# Patient Record
Sex: MALE | URBAN - METROPOLITAN AREA
[De-identification: names, ages, dates, MRNs, and addresses within clinical notes are randomized per-mention and may not be internally consistent; named-entity substitution may affect disease eponyms.]

---

## 2022-11-01 ENCOUNTER — TELEPHONE ENCOUNTER (OUTPATIENT)
Dept: URBAN - METROPOLITAN AREA CLINIC 82 | Facility: CLINIC | Age: 22
End: 2022-11-01

## 2022-12-30 ENCOUNTER — OFFICE VISIT (OUTPATIENT)
Dept: URBAN - METROPOLITAN AREA CLINIC 82 | Facility: CLINIC | Age: 22
End: 2022-12-30
Payer: COMMERCIAL

## 2022-12-30 ENCOUNTER — WEB ENCOUNTER (OUTPATIENT)
Dept: URBAN - METROPOLITAN AREA CLINIC 82 | Facility: CLINIC | Age: 22
End: 2022-12-30

## 2022-12-30 ENCOUNTER — LAB OUTSIDE AN ENCOUNTER (OUTPATIENT)
Dept: URBAN - METROPOLITAN AREA CLINIC 82 | Facility: CLINIC | Age: 22
End: 2022-12-30

## 2022-12-30 VITALS
TEMPERATURE: 96.9 F | DIASTOLIC BLOOD PRESSURE: 62 MMHG | HEIGHT: 65 IN | WEIGHT: 147.8 LBS | BODY MASS INDEX: 24.62 KG/M2 | SYSTOLIC BLOOD PRESSURE: 105 MMHG | HEART RATE: 60 BPM

## 2022-12-30 DIAGNOSIS — R10.9 ABDOMINAL PAIN, UNSPECIFIED ABDOMINAL LOCATION: ICD-10-CM

## 2022-12-30 DIAGNOSIS — K59.00 CONSTIPATION, UNSPECIFIED CONSTIPATION TYPE: ICD-10-CM

## 2022-12-30 DIAGNOSIS — R19.7 DIARRHEA, UNSPECIFIED TYPE: ICD-10-CM

## 2022-12-30 DIAGNOSIS — R19.4 BOWEL HABIT CHANGES: ICD-10-CM

## 2022-12-30 PROCEDURE — 99204 OFFICE O/P NEW MOD 45 MIN: CPT | Performed by: STUDENT IN AN ORGANIZED HEALTH CARE EDUCATION/TRAINING PROGRAM

## 2022-12-30 NOTE — HPI-TODAY'S VISIT:
22 y.o male presents today for concerns of changes of bowel habits on and off since 3 years. He was first experiencing constipation, BM: once every two days, then switch to having diarrhea, type 6-7 on BFSC after taking miralax and increasing his fiber intake. For the past two months now, pt reports increased in BM up to 6 per day without using miralax or stool softner. Does reports o incomplete evacuation sensation but denies any fecal incontinence. No nocturnal sx or wt loss.  He admits generalized abd pain, resolved after BMs.  Also reports of BRBPR on toilet paper and 1-2 episodes of mixing in with stool.   Denies any acid reflux, bloating, indigestion. Reports "slight" anemia at PCP in Nov 2022, labs will be repeated in Jan 2023. No FHX of anemia, IBD, celiac dz, or CRC

## 2023-01-03 PROBLEM — 14760008: Status: ACTIVE | Noted: 2022-12-30

## 2023-01-03 PROBLEM — 88111009: Status: ACTIVE | Noted: 2022-12-30

## 2023-01-03 PROBLEM — 62315008: Status: ACTIVE | Noted: 2022-12-30

## 2023-01-03 PROBLEM — 21522001: Status: ACTIVE | Noted: 2022-12-30

## 2023-01-04 ENCOUNTER — CLAIMS CREATED FROM THE CLAIM WINDOW (OUTPATIENT)
Dept: URBAN - METROPOLITAN AREA CLINIC 4 | Facility: CLINIC | Age: 23
End: 2023-01-04
Payer: COMMERCIAL

## 2023-01-04 ENCOUNTER — OFFICE VISIT (OUTPATIENT)
Dept: URBAN - METROPOLITAN AREA SURGERY CENTER 13 | Facility: SURGERY CENTER | Age: 23
End: 2023-01-04
Payer: COMMERCIAL

## 2023-01-04 DIAGNOSIS — K50.111 CROHN'S COLITIS, WITH RECTAL BLEEDING: ICD-10-CM

## 2023-01-04 DIAGNOSIS — R10.33 ABDOMINAL PAIN, ACUTE, PERIUMBILICAL: ICD-10-CM

## 2023-01-04 DIAGNOSIS — K50.118 CROHN'S DISEASE OF LARGE INTESTINE WITH OTHER COMPLICATION: ICD-10-CM

## 2023-01-04 PROCEDURE — 88305 TISSUE EXAM BY PATHOLOGIST: CPT | Performed by: PATHOLOGY

## 2023-01-04 PROCEDURE — G8907 PT DOC NO EVENTS ON DISCHARG: HCPCS | Performed by: INTERNAL MEDICINE

## 2023-01-04 PROCEDURE — 45380 COLONOSCOPY AND BIOPSY: CPT | Performed by: INTERNAL MEDICINE

## 2023-01-04 RX ORDER — BUDESONIDE 3 MG/1
3 CAPSULES CAPSULE ORAL ONCE A DAY
Qty: 90 | Refills: 1 | OUTPATIENT

## 2023-01-06 LAB
A/G RATIO: 0.9
ALBUMIN: 4.1
ALKALINE PHOSPHATASE: 87
ALT (SGPT): 10
AST (SGOT): 14
BILIRUBIN, TOTAL: 0.9
BUN/CREATININE RATIO: (no result)
BUN: 17
C-REACTIVE PROTEIN, QUANT: 31.6
CALCIUM: 9.2
CARBON DIOXIDE, TOTAL: 24
CHLORIDE: 100
CREATININE: 1.04
EGFR: 105
FERRITIN, SERUM: 56
GLOBULIN, TOTAL: 4.4
GLUCOSE: 69
HEMATOCRIT: 40.2
HEMOGLOBIN: 12.9
HEPATITIS B CORE AB TOTAL: (no result)
HEPATITIS B SURFACE AB IMMUNITY, QN: <5
HEPATITIS B SURFACE ANTIGEN: (no result)
IRON BIND.CAP.(TIBC): 260
IRON SATURATION: 23
IRON: 61
MCH: 26.5
MCHC: 32.1
MCV: 82.5
MITOGEN-NIL: >10
MPV: 11.9
PLATELET COUNT: 215
POTASSIUM: 4.3
PROTEIN, TOTAL: 8.5
QUANTIFERON NIL VALUE: 0.06
QUANTIFERON TB1 AG VALUE: 0.01
QUANTIFERON TB2 AG VALUE: 0.08
QUANTIFERON-TB GOLD PLUS: NEGATIVE
RDW: 12.4
RED BLOOD CELL COUNT: 4.87
SODIUM: 134
WHITE BLOOD CELL COUNT: 5.1

## 2023-01-11 ENCOUNTER — LAB OUTSIDE AN ENCOUNTER (OUTPATIENT)
Dept: URBAN - METROPOLITAN AREA CLINIC 115 | Facility: CLINIC | Age: 23
End: 2023-01-11

## 2023-01-17 ENCOUNTER — TELEPHONE ENCOUNTER (OUTPATIENT)
Dept: URBAN - METROPOLITAN AREA CLINIC 92 | Facility: CLINIC | Age: 23
End: 2023-01-17

## 2023-01-17 ENCOUNTER — TELEPHONE ENCOUNTER (OUTPATIENT)
Dept: URBAN - METROPOLITAN AREA CLINIC 115 | Facility: CLINIC | Age: 23
End: 2023-01-17

## 2023-01-17 LAB
C. DIFFICILE TOXIN A/B, STOOL - QDX: NEGATIVE
CALPROTECTIN, STOOL - QDX: (no result)
GASTROINTESTINAL PATHOGEN: (no result)

## 2023-01-17 RX ORDER — BUDESONIDE 3 MG/1
3 CAPSULES CAPSULE ORAL ONCE A DAY
Qty: 90 | Refills: 1 | Status: ACTIVE | COMMUNITY

## 2023-01-17 RX ORDER — FIDAXOMICIN 200 MG/1
1 TABLET TABLET, FILM COATED ORAL TWICE A DAY
Qty: 20 | OUTPATIENT
Start: 2023-01-17 | End: 2023-01-27

## 2023-01-27 ENCOUNTER — ERX REFILL RESPONSE (OUTPATIENT)
Dept: URBAN - METROPOLITAN AREA CLINIC 82 | Facility: CLINIC | Age: 23
End: 2023-01-27

## 2023-01-27 RX ORDER — BUDESONIDE 3 MG/1
3 CAPSULES CAPSULE ORAL ONCE A DAY
Qty: 90 | Refills: 1 | OUTPATIENT

## 2023-01-27 RX ORDER — BUDESONIDE 3 MG/1
3 CAPSULES ORALLY ONCE A DAY 30 DAYS CAPSULE, GELATIN COATED ORAL
Qty: 90 CAPSULE | Refills: 1 | OUTPATIENT

## 2023-02-03 ENCOUNTER — OFFICE VISIT (OUTPATIENT)
Dept: URBAN - METROPOLITAN AREA CLINIC 82 | Facility: CLINIC | Age: 23
End: 2023-02-03

## 2023-02-03 RX ORDER — BUDESONIDE 3 MG/1
3 CAPSULES ORALLY ONCE A DAY 30 DAYS CAPSULE, GELATIN COATED ORAL
Qty: 90 CAPSULE | Refills: 1 | Status: ACTIVE | COMMUNITY

## 2023-02-23 ENCOUNTER — ERX REFILL RESPONSE (OUTPATIENT)
Dept: URBAN - METROPOLITAN AREA CLINIC 82 | Facility: CLINIC | Age: 23
End: 2023-02-23

## 2023-02-23 RX ORDER — BUDESONIDE 3 MG/1
TAKE 3 CAPSULES BY MOUTH EVERY DAY CAPSULE, GELATIN COATED ORAL
Qty: 90 CAPSULE | Refills: 1 | OUTPATIENT

## 2023-02-23 RX ORDER — BUDESONIDE 3 MG/1
3 CAPSULES ORALLY ONCE A DAY 30 DAYS CAPSULE, GELATIN COATED ORAL
Qty: 90 CAPSULE | Refills: 1 | OUTPATIENT

## 2023-02-28 ENCOUNTER — OFFICE VISIT (OUTPATIENT)
Dept: URBAN - METROPOLITAN AREA CLINIC 82 | Facility: CLINIC | Age: 23
End: 2023-02-28

## 2023-02-28 RX ORDER — BUDESONIDE 3 MG/1
TAKE 3 CAPSULES BY MOUTH EVERY DAY CAPSULE, GELATIN COATED ORAL
Qty: 90 CAPSULE | Refills: 1 | Status: ACTIVE | COMMUNITY

## 2023-03-17 ENCOUNTER — OFFICE VISIT (OUTPATIENT)
Dept: URBAN - METROPOLITAN AREA CLINIC 82 | Facility: CLINIC | Age: 23
End: 2023-03-17
Payer: COMMERCIAL

## 2023-03-17 VITALS
HEART RATE: 65 BPM | RESPIRATION RATE: 15 BRPM | DIASTOLIC BLOOD PRESSURE: 64 MMHG | BODY MASS INDEX: 24.16 KG/M2 | TEMPERATURE: 97.5 F | SYSTOLIC BLOOD PRESSURE: 99 MMHG | HEIGHT: 65 IN | WEIGHT: 145 LBS

## 2023-03-17 DIAGNOSIS — A04.72 C. DIFFICILE COLITIS: ICD-10-CM

## 2023-03-17 DIAGNOSIS — K52.9 IBD (INFLAMMATORY BOWEL DISEASE): ICD-10-CM

## 2023-03-17 DIAGNOSIS — K50.10 CROHN'S DISEASE OF LARGE INTESTINE WITHOUT COMPLICATION: ICD-10-CM

## 2023-03-17 PROBLEM — 307418008 NON-INFECTIVE ENTERITIS AND COLITIS: Status: ACTIVE | Noted: 2023-03-17

## 2023-03-17 PROBLEM — 7620006: Status: ACTIVE | Noted: 2023-03-17

## 2023-03-17 PROBLEM — 423590009: Status: ACTIVE | Noted: 2023-03-17

## 2023-03-17 PROCEDURE — 99214 OFFICE O/P EST MOD 30 MIN: CPT | Performed by: INTERNAL MEDICINE

## 2023-03-17 PROCEDURE — 86480 TB TEST CELL IMMUN MEASURE: CPT | Performed by: INTERNAL MEDICINE

## 2023-03-17 RX ORDER — BUDESONIDE 3 MG/1
TAKE 3 CAPSULES BY MOUTH EVERY DAY CAPSULE, GELATIN COATED ORAL
Qty: 90 CAPSULE | Refills: 1 | Status: ACTIVE | COMMUNITY

## 2023-03-17 NOTE — HPI-TODAY'S VISIT:
22 y.o male presents today for 4 months F/U. Recap of last visit: changes of bowel habits on and off since 3 years. He was first experiencing constipation, BM: once every two days, then switch to having diarrhea, type 6-7 on BFSC after taking miralax and increasing his fiber intake. He admits generalized abd pain, resolved after BMs.  Also reports of BRBPR on toilet paper and 1-2 episodes of mixing in with stool.  Colonoscopy 1/2023: Moderate inflammation was found in the rectum and in the entire examined colon secondary to pancolitis ulcerative colitis. The examined portion of the ileum was normal. Internal hemorrhoids noted. Bx result: Significant active inflammation throughout rectum and colon consistent with Crohns disease and Crohns colitis. Patient was given budesonide 9 mg PO one time per day daily. Plan to repeat colonoscopy in 1 year to evaluate the response to therapy. Stool studies also revealed c diff, was given difficid to use.  At this visit, patient admits improvements in bowel habits. BM: 2 per day, improvements in BRBPR, only having it a few times a month. No abd pain. No fecal urgency. Reports compliance w/ difficid. Still taking budesonide TID, sometimes would miss a dose tho. No other concerns.

## 2023-03-24 LAB
A/G RATIO: 1
ABSOLUTE BASOPHILS: 12
ABSOLUTE EOSINOPHILS: 98
ABSOLUTE LYMPHOCYTES: 1853
ABSOLUTE MONOCYTES: 437
ABSOLUTE NEUTROPHILS: 1502
ALBUMIN: 4.1
ALKALINE PHOSPHATASE: 84
ALT (SGPT): 12
ANCA SCREEN: NEGATIVE
AST (SGOT): 12
BASOPHILS: 0.3
BILIRUBIN, TOTAL: 0.5
BUN/CREATININE RATIO: (no result)
BUN: 14
CALCIUM: 9.4
CARBON DIOXIDE, TOTAL: 28
CHLORIDE: 104
CREATININE: 0.98
EGFR: 113
EOSINOPHILS: 2.5
GLOBULIN, TOTAL: 4
GLUCOSE: 83
HBSAG SCREEN: (no result)
HEMATOCRIT: 38.9
HEMOGLOBIN: 12.5
HEP A AB, IGM: (no result)
HEP B CORE AB, IGM: (no result)
HEP C VIRUS AB: <0.02
HEPATITIS C ANTIBODY: (no result)
LYMPHOCYTES: 47.5
MCH: 26.7
MCHC: 32.1
MCV: 83.1
MITOGEN-NIL: >10
MONOCYTES: 11.2
MPV: 11.9
MYELOPEROXIDASE ANTIBODY: <1
NEUTROPHILS: 38.5
PLATELET COUNT: 213
POTASSIUM: 4.2
PROTEIN, TOTAL: 8.1
PROTEINASE-3 ANTIBODY: <1
QUANTIFERON NIL VALUE: 0.07
QUANTIFERON TB1 AG VALUE: <0
QUANTIFERON TB2 AG VALUE: 0.01
QUANTIFERON-TB GOLD PLUS: NEGATIVE
RDW: 13.1
RED BLOOD CELL COUNT: 4.68
SACCHAROMYCES CEREVISIAE AB (ASCA) (IGA): 6
SACCHAROMYCES CEREVISIAE AB (ASCA) (IGG): 7.9
SODIUM: 138
WHITE BLOOD CELL COUNT: 3.9

## 2023-03-30 ENCOUNTER — ERX REFILL RESPONSE (OUTPATIENT)
Dept: URBAN - METROPOLITAN AREA CLINIC 82 | Facility: CLINIC | Age: 23
End: 2023-03-30

## 2023-03-30 RX ORDER — BUDESONIDE 3 MG/1
TAKE 3 CAPSULES BY MOUTH EVERY DAY CAPSULE, GELATIN COATED ORAL
Qty: 90 CAPSULE | Refills: 1 | OUTPATIENT

## 2023-04-14 ENCOUNTER — ERX REFILL RESPONSE (OUTPATIENT)
Dept: URBAN - METROPOLITAN AREA CLINIC 82 | Facility: CLINIC | Age: 23
End: 2023-04-14

## 2023-04-14 RX ORDER — BUDESONIDE 3 MG/1
TAKE 3 CAPSULES BY MOUTH EVERY DAY 30 CAPSULE, GELATIN COATED ORAL
Qty: 270 CAPSULE | Refills: 1 | OUTPATIENT

## 2023-04-14 RX ORDER — BUDESONIDE 3 MG/1
TAKE 3 CAPSULES BY MOUTH EVERY DAY CAPSULE, GELATIN COATED ORAL
Qty: 90 CAPSULE | Refills: 1 | OUTPATIENT

## 2023-04-18 ENCOUNTER — OFFICE VISIT (OUTPATIENT)
Dept: URBAN - METROPOLITAN AREA CLINIC 82 | Facility: CLINIC | Age: 23
End: 2023-04-18

## 2023-04-18 RX ORDER — BUDESONIDE 3 MG/1
TAKE 3 CAPSULES BY MOUTH EVERY DAY CAPSULE, GELATIN COATED ORAL
Qty: 90 CAPSULE | Refills: 1 | COMMUNITY

## 2023-06-02 ENCOUNTER — OFFICE VISIT (OUTPATIENT)
Dept: URBAN - METROPOLITAN AREA CLINIC 82 | Facility: CLINIC | Age: 23
End: 2023-06-02

## 2023-06-15 ENCOUNTER — OFFICE VISIT (OUTPATIENT)
Dept: URBAN - METROPOLITAN AREA CLINIC 82 | Facility: CLINIC | Age: 23
End: 2023-06-15
Payer: COMMERCIAL

## 2023-06-15 VITALS
HEIGHT: 65 IN | DIASTOLIC BLOOD PRESSURE: 67 MMHG | TEMPERATURE: 97.9 F | WEIGHT: 145.6 LBS | SYSTOLIC BLOOD PRESSURE: 103 MMHG | HEART RATE: 73 BPM | BODY MASS INDEX: 24.26 KG/M2

## 2023-06-15 DIAGNOSIS — K50.10 CROHN'S DISEASE OF COLON WITHOUT COMPLICATION: ICD-10-CM

## 2023-06-15 PROBLEM — 50440006: Status: ACTIVE | Noted: 2023-06-15

## 2023-06-15 PROCEDURE — 99213 OFFICE O/P EST LOW 20 MIN: CPT | Performed by: INTERNAL MEDICINE

## 2023-06-15 RX ORDER — BUDESONIDE 3 MG/1
TAKE 3 CAPSULES BY MOUTH EVERY DAY 30 CAPSULE, GELATIN COATED ORAL
Qty: 270 CAPSULE | Refills: 1 | Status: ACTIVE | COMMUNITY

## 2023-06-15 RX ORDER — USTEKINUMAB 90 MG/ML
90 MG INJECTION, SOLUTION SUBCUTANEOUS
Qty: 1 | Refills: 6 | OUTPATIENT
Start: 2023-06-15

## 2023-06-15 RX ORDER — BUDESONIDE 3 MG/1
TAKE 3 CAPSULES BY MOUTH EVERY DAY CAPSULE, GELATIN COATED ORAL
Qty: 270 | Refills: 0

## 2023-06-15 RX ORDER — BUDESONIDE 3 MG/1
TAKE 3 CAPSULES BY MOUTH EVERY DAY CAPSULE, GELATIN COATED ORAL
Qty: 90 CAPSULE | Refills: 1 | COMMUNITY

## 2023-06-15 RX ORDER — USTEKINUMAB 130 MG/26ML
AS DIRECTED SOLUTION INTRAVENOUS ONCE
Qty: 390 MILLIGRAMS | Refills: 0 | OUTPATIENT
Start: 2023-06-15 | End: 2023-06-16

## 2023-06-15 NOTE — HPI-TODAY'S VISIT:
Colonoscopy '23 w moderate pan-Crohns colitis, nml TI. Has remained on budesonide, sx controlled. autism. mother present for decisions.

## 2023-06-22 ENCOUNTER — TELEPHONE ENCOUNTER (OUTPATIENT)
Dept: URBAN - METROPOLITAN AREA CLINIC 97 | Facility: CLINIC | Age: 23
End: 2023-06-22

## 2023-06-27 ENCOUNTER — OFFICE VISIT (OUTPATIENT)
Dept: URBAN - METROPOLITAN AREA CLINIC 114 | Facility: CLINIC | Age: 23
End: 2023-06-27
Payer: COMMERCIAL

## 2023-06-27 VITALS
WEIGHT: 140.6 LBS | RESPIRATION RATE: 18 BRPM | BODY MASS INDEX: 23.43 KG/M2 | HEART RATE: 77 BPM | SYSTOLIC BLOOD PRESSURE: 109 MMHG | HEIGHT: 65 IN | TEMPERATURE: 97.5 F | DIASTOLIC BLOOD PRESSURE: 70 MMHG

## 2023-06-27 DIAGNOSIS — K50.80 CROHN'S COLITIS: ICD-10-CM

## 2023-06-27 PROCEDURE — 96413 CHEMO IV INFUSION 1 HR: CPT | Performed by: INTERNAL MEDICINE

## 2023-06-27 RX ORDER — BUDESONIDE 3 MG/1
TAKE 3 CAPSULES BY MOUTH EVERY DAY CAPSULE, GELATIN COATED ORAL
Qty: 90 CAPSULE | Refills: 1 | COMMUNITY

## 2023-06-27 RX ORDER — USTEKINUMAB 90 MG/ML
90 MG INJECTION, SOLUTION SUBCUTANEOUS
Qty: 1 | Refills: 6 | Status: ACTIVE | COMMUNITY
Start: 2023-06-15

## 2023-06-27 RX ORDER — BUDESONIDE 3 MG/1
TAKE 3 CAPSULES BY MOUTH EVERY DAY CAPSULE, GELATIN COATED ORAL
Qty: 270 | Refills: 0 | Status: ACTIVE | COMMUNITY

## 2023-06-30 ENCOUNTER — TELEPHONE ENCOUNTER (OUTPATIENT)
Dept: URBAN - METROPOLITAN AREA CLINIC 117 | Facility: CLINIC | Age: 23
End: 2023-06-30

## 2023-08-22 ENCOUNTER — TELEPHONE ENCOUNTER (OUTPATIENT)
Dept: URBAN - METROPOLITAN AREA CLINIC 97 | Facility: CLINIC | Age: 23
End: 2023-08-22

## 2023-08-24 ENCOUNTER — WEB ENCOUNTER (OUTPATIENT)
Dept: URBAN - METROPOLITAN AREA CLINIC 82 | Facility: CLINIC | Age: 23
End: 2023-08-24

## 2023-09-22 ENCOUNTER — DASHBOARD ENCOUNTERS (OUTPATIENT)
Age: 23
End: 2023-09-22

## 2023-09-22 ENCOUNTER — OFFICE VISIT (OUTPATIENT)
Dept: URBAN - METROPOLITAN AREA CLINIC 115 | Facility: CLINIC | Age: 23
End: 2023-09-22
Payer: COMMERCIAL

## 2023-09-22 VITALS
SYSTOLIC BLOOD PRESSURE: 109 MMHG | HEIGHT: 65 IN | TEMPERATURE: 98.5 F | WEIGHT: 142.6 LBS | DIASTOLIC BLOOD PRESSURE: 72 MMHG | BODY MASS INDEX: 23.76 KG/M2 | HEART RATE: 69 BPM

## 2023-09-22 DIAGNOSIS — K51.00 ULCERATIVE PANCOLITIS: ICD-10-CM

## 2023-09-22 DIAGNOSIS — D64.89 ANEMIA DUE TO OTHER CAUSE: ICD-10-CM

## 2023-09-22 PROBLEM — 444548001: Status: ACTIVE | Noted: 2023-09-22

## 2023-09-22 PROCEDURE — 99213 OFFICE O/P EST LOW 20 MIN: CPT | Performed by: INTERNAL MEDICINE

## 2023-09-22 RX ORDER — BUDESONIDE 3 MG/1
2 CAP DAILY FOR 2 WEEKS THEN 1 CAP DAILY FOR 2 WEEKS THEN STOP CAPSULE, GELATIN COATED ORAL
Qty: 42 | Refills: 0

## 2023-09-22 RX ORDER — BUDESONIDE 3 MG/1
TAKE 3 CAPSULES BY MOUTH EVERY DAY CAPSULE, GELATIN COATED ORAL
Qty: 90 CAPSULE | Refills: 1 | Status: DISCONTINUED | COMMUNITY

## 2023-09-22 RX ORDER — USTEKINUMAB 90 MG/ML
90 MG INJECTION, SOLUTION SUBCUTANEOUS
Qty: 1 | Refills: 6 | Status: ACTIVE | COMMUNITY
Start: 2023-06-15

## 2023-09-22 RX ORDER — BUDESONIDE 3 MG/1
TAKE 3 CAPSULES BY MOUTH EVERY DAY CAPSULE, GELATIN COATED ORAL
Qty: 270 | Refills: 0 | Status: ACTIVE | COMMUNITY

## 2023-09-22 NOTE — HPI-TODAY'S VISIT:
Selara infusion 6/2023, first injection 9/2023. On budesonide. Asx. Prior hx: Colonoscopy '23 w moderate pan-Crohns colitis, nml TI. Has remained on budesonide, sx controlled. autism. previously, mother present for decisions.

## 2023-10-05 ENCOUNTER — LAB OUTSIDE AN ENCOUNTER (OUTPATIENT)
Dept: URBAN - METROPOLITAN AREA CLINIC 115 | Facility: CLINIC | Age: 23
End: 2023-10-05

## 2023-10-12 LAB
A/G RATIO: 1.1
ALBUMIN: 4.2
ALKALINE PHOSPHATASE: 103
ALT (SGPT): 10
AST (SGOT): 11
BILIRUBIN, TOTAL: 0.6
BUN/CREATININE RATIO: (no result)
BUN: 16
C-REACTIVE PROTEIN, QUANT: 8.6
CALCIUM: 9.5
CALPROTECTIN, FECAL: 2230
CARBON DIOXIDE, TOTAL: 25
CHLORIDE: 103
CLOSTRIDIUM DIFFICILE: (no result)
CREATININE: 1.01
EGFR: 108
FERRITIN, SERUM: 26
FOLATE (FOLIC ACID), SERUM: 16
GLOBULIN, TOTAL: 4
GLUCOSE: 99
HEMATOCRIT: 42.8
HEMOGLOBIN: 14.1
HEPATITIS B CORE AB TOTAL: (no result)
IRON BIND.CAP.(TIBC): 285
IRON SATURATION: 20
IRON: 58
MCH: 27.1
MCHC: 32.9
MCV: 82.1
MPV: 12.1
PLATELET COUNT: 190
POTASSIUM: 4.2
PROTEIN, TOTAL: 8.2
RDW: 12.8
RED BLOOD CELL COUNT: 5.21
SODIUM: 138
VITAMIN B12: 551
WHITE BLOOD CELL COUNT: 3.4

## 2023-11-13 ENCOUNTER — ERX REFILL RESPONSE (OUTPATIENT)
Dept: URBAN - METROPOLITAN AREA CLINIC 115 | Facility: CLINIC | Age: 23
End: 2023-11-13

## 2023-11-13 RX ORDER — BUDESONIDE 3 MG/1
2 CAP DAILY FOR 2 WEEKS THEN 1 CAP DAILY FOR 2 WEEKS THEN STOP 28 DAYS CAPSULE, GELATIN COATED ORAL
Qty: 126 CAPSULE | Refills: 1 | OUTPATIENT

## 2023-11-13 RX ORDER — BUDESONIDE 3 MG/1
2 CAP DAILY FOR 2 WEEKS THEN 1 CAP DAILY FOR 2 WEEKS THEN STOP CAPSULE, GELATIN COATED ORAL
Qty: 42 | Refills: 0 | OUTPATIENT

## 2023-12-12 ENCOUNTER — OFFICE VISIT (OUTPATIENT)
Dept: URBAN - METROPOLITAN AREA CLINIC 115 | Facility: CLINIC | Age: 23
End: 2023-12-12

## 2024-01-23 ENCOUNTER — OFFICE VISIT (OUTPATIENT)
Dept: URBAN - METROPOLITAN AREA CLINIC 115 | Facility: CLINIC | Age: 24
End: 2024-01-23

## 2024-02-20 ENCOUNTER — OV EP (OUTPATIENT)
Dept: URBAN - METROPOLITAN AREA CLINIC 115 | Facility: CLINIC | Age: 24
End: 2024-02-20

## 2024-09-03 ENCOUNTER — ERX REFILL RESPONSE (OUTPATIENT)
Dept: URBAN - METROPOLITAN AREA CLINIC 82 | Facility: CLINIC | Age: 24
End: 2024-09-03

## 2024-09-03 RX ORDER — USTEKINUMAB 90 MG/ML
INJECT 1 SYRINGE UNDER THE SKIN EVERY 8 WEEKS INJECTION, SOLUTION SUBCUTANEOUS
Qty: 1 | Refills: 7 | OUTPATIENT

## 2024-09-03 RX ORDER — USTEKINUMAB 90 MG/ML
INJECT 1 SYRINGE UNDER THE SKIN EVERY 8 WEEKS INJECTION, SOLUTION SUBCUTANEOUS
Qty: 1 | Refills: 8 | OUTPATIENT

## 2025-02-07 ENCOUNTER — TELEPHONE ENCOUNTER (OUTPATIENT)
Dept: URBAN - METROPOLITAN AREA CLINIC 115 | Facility: CLINIC | Age: 25
End: 2025-02-07

## 2025-02-07 RX ORDER — USTEKINUMAB 90 MG/ML
90MG INJECTION, SOLUTION SUBCUTANEOUS
Qty: 1 | Refills: 6

## 2025-02-10 ENCOUNTER — TELEPHONE ENCOUNTER (OUTPATIENT)
Dept: URBAN - METROPOLITAN AREA CLINIC 115 | Facility: CLINIC | Age: 25
End: 2025-02-10

## 2025-02-13 ENCOUNTER — TELEPHONE ENCOUNTER (OUTPATIENT)
Dept: URBAN - METROPOLITAN AREA CLINIC 115 | Facility: CLINIC | Age: 25
End: 2025-02-13

## 2025-02-14 ENCOUNTER — TELEPHONE ENCOUNTER (OUTPATIENT)
Dept: URBAN - METROPOLITAN AREA CLINIC 115 | Facility: CLINIC | Age: 25
End: 2025-02-14

## 2025-02-14 RX ORDER — USTEKINUMAB 90 MG/ML
90MG INJECTION, SOLUTION SUBCUTANEOUS
Qty: 1 | Refills: 6
End: 2026-04-10